# Patient Record
Sex: FEMALE
[De-identification: names, ages, dates, MRNs, and addresses within clinical notes are randomized per-mention and may not be internally consistent; named-entity substitution may affect disease eponyms.]

---

## 2020-03-01 ENCOUNTER — RX ONLY (OUTPATIENT)
Age: 30
Setting detail: RX ONLY
End: 2020-03-01

## 2020-06-29 ENCOUNTER — RX ONLY (OUTPATIENT)
Age: 30
Setting detail: RX ONLY
End: 2020-06-29

## 2020-07-15 ENCOUNTER — RX ONLY (OUTPATIENT)
Age: 30
Setting detail: RX ONLY
End: 2020-07-15

## 2021-10-27 ENCOUNTER — APPOINTMENT (RX ONLY)
Dept: URBAN - METROPOLITAN AREA CLINIC 56 | Facility: CLINIC | Age: 31
Setting detail: DERMATOLOGY
End: 2021-10-27

## 2021-10-27 DIAGNOSIS — L70.0 ACNE VULGARIS: ICD-10-CM

## 2021-10-27 PROCEDURE — 99212 OFFICE O/P EST SF 10 MIN: CPT

## 2021-10-27 PROCEDURE — ? TREATMENT REGIMEN

## 2021-10-27 PROCEDURE — ? PRESCRIPTION

## 2021-10-27 RX ORDER — ADAPALENE AND BENZOYL PEROXIDE 1; 25 MG/G; MG/G
GEL TOPICAL QHS
Qty: 45 | Refills: 6 | Status: ERX | COMMUNITY
Start: 2021-10-27

## 2021-10-27 RX ORDER — CLINDAMYCIN PHOSPHATE 10 MG/G
GEL TOPICAL
Qty: 60 | Refills: 6 | Status: ERX | COMMUNITY
Start: 2021-10-27

## 2021-10-27 RX ADMIN — CLINDAMYCIN PHOSPHATE: 10 GEL TOPICAL at 00:00

## 2021-10-27 RX ADMIN — ADAPALENE AND BENZOYL PEROXIDE: 1; 25 GEL TOPICAL at 00:00

## 2021-10-27 NOTE — PROCEDURE: MIPS QUALITY
Detail Level: Detailed
Quality 226: Preventive Care And Screening: Tobacco Use: Screening And Cessation Intervention: Patient screened for tobacco use and is an ex/non-smoker
Quality 431: Preventive Care And Screening: Unhealthy Alcohol Use - Screening: Patient not identified as an unhealthy alcohol user when screened for unhealthy alcohol use using a systematic screening method
Quality 111:Pneumonia Vaccination Status For Older Adults: Pneumococcal Vaccination not Administered or Previously Received, Reason not Otherwise Specified
Quality 110: Preventive Care And Screening: Influenza Immunization: Influenza Immunization Administered during Influenza season

## 2021-10-27 NOTE — PROCEDURE: TREATMENT REGIMEN
Detail Level: Zone
Samples Given: CeraVe Benzoyl Peroxide wash
Plan: Recommended she return in 6 months for follow-up.  Patient decides to pursue treatment of her acne with Accutane and to avoid pregnancy as needed she will contact me earlier.
Continue Regimen: Continue adapalene 0.1%/benzoyl peroxide 2.5% gel daily at bedtime and clindamycin 1% gel every morning.
Samples Given: Patient was given samples of CereVe benzyl peroxide facial cleanser to try.

## 2022-04-27 ENCOUNTER — APPOINTMENT (RX ONLY)
Dept: URBAN - METROPOLITAN AREA CLINIC 56 | Facility: CLINIC | Age: 32
Setting detail: DERMATOLOGY
End: 2022-04-27

## 2022-04-27 DIAGNOSIS — L70.0 ACNE VULGARIS: ICD-10-CM

## 2022-04-27 PROCEDURE — ? TREATMENT REGIMEN

## 2022-04-27 PROCEDURE — 99213 OFFICE O/P EST LOW 20 MIN: CPT

## 2022-04-27 NOTE — PROCEDURE: MIPS QUALITY
Detail Level: Detailed
Quality 226: Preventive Care And Screening: Tobacco Use: Screening And Cessation Intervention: Patient screened for tobacco use and is an ex/non-smoker
Quality 431: Preventive Care And Screening: Unhealthy Alcohol Use - Screening: Patient not identified as an unhealthy alcohol user when screened for unhealthy alcohol use using a systematic screening method
Quality 110: Preventive Care And Screening: Influenza Immunization: Influenza Immunization not Administered for Documented Reasons.

## 2022-04-27 NOTE — PROCEDURE: TREATMENT REGIMEN
Continue Regimen: Continue adapalene 0.1%/benzoyl peroxide 2.5% gel nightly and clindamycin 1% gel every morning.  She will also continue CeraVe a benzyl peroxide facial cleanser.  Patient will contact the office if she needs refills.
Detail Level: Zone
dressing

## 2022-04-27 NOTE — HPI: OTHER
Condition:: Follow-up acne treatment
Please Describe Your Condition:: Patient returns for follow-up of treatment of facial acne.  She currently is using adapalene 0.1%/benzoyl peroxide 2.5% gel nightly and clindamycin 1% gel every morning.  She also uses CeraVe a benzyl peroxide facial cleanser.  Since I last saw her she is changed her diet which she feels is greatly benefited her acne.  She notes some flaring of her acne associated with stress.  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  Patient discontinued Bactrim as she was attempting to get pregnant.  Several times I have recommended treatment with isotretinoin when her acne was severe but at this point her acne seems to be doing quite well.

## 2022-11-14 ENCOUNTER — RX ONLY (OUTPATIENT)
Age: 32
Setting detail: RX ONLY
End: 2022-11-14

## 2022-11-14 RX ORDER — ADAPALENE AND BENZOYL PEROXIDE 1; 25 MG/G; MG/G
GEL TOPICAL
Qty: 45 | Refills: 3 | Status: ERX

## 2024-02-14 ENCOUNTER — RX ONLY (OUTPATIENT)
Age: 34
Setting detail: RX ONLY
End: 2024-02-14

## 2024-02-14 ENCOUNTER — APPOINTMENT (RX ONLY)
Dept: URBAN - METROPOLITAN AREA CLINIC 56 | Facility: CLINIC | Age: 34
Setting detail: DERMATOLOGY
End: 2024-02-14

## 2024-02-14 DIAGNOSIS — L70.0 ACNE VULGARIS: ICD-10-CM | Status: WORSENING

## 2024-02-14 PROCEDURE — 99214 OFFICE O/P EST MOD 30 MIN: CPT

## 2024-02-14 PROCEDURE — ? TREATMENT REGIMEN

## 2024-02-14 PROCEDURE — ? PRESCRIPTION

## 2024-02-14 RX ORDER — CLINDAMYCIN PHOSPHATE 10 MG/G
GEL TOPICAL
Qty: 60 | Refills: 3 | Status: ERX

## 2024-02-14 RX ORDER — CLINDAMYCIN PHOSPHATE AND BENZOYL PEROXIDE 1 %-5 %
KIT TOPICAL
Qty: 50 | Refills: 3 | Status: ERX | COMMUNITY
Start: 2024-02-14

## 2024-02-14 RX ORDER — ADAPALENE AND BENZOYL PEROXIDE 1; 25 MG/G; MG/G
GEL TOPICAL
Qty: 45 | Refills: 3 | Status: ERX

## 2024-02-14 RX ORDER — SULFAMETHOXAZOLE AND TRIMETHOPRIM 800; 160 MG/1; MG/1
TABLET ORAL BID
Qty: 60 | Refills: 2 | Status: ERX | COMMUNITY
Start: 2024-02-14

## 2024-02-14 RX ADMIN — SULFAMETHOXAZOLE AND TRIMETHOPRIM: 800; 160 TABLET ORAL at 00:00

## 2024-02-14 RX ADMIN — CLINDAMYCIN PHOSPHATE AND BENZOYL PEROXIDE: KIT at 00:00

## 2024-02-14 NOTE — PROCEDURE: TREATMENT REGIMEN
Hide Cerave Products: No
Action 4: Continue
Initiate Regimen: Bactrim double strength tablets 1 p.o. twice daily.  Recommend she stay on this dose for 1 month and then decrease to 1 double strength tablet daily if doing better.  She was told to take the medication with a large glass of water\\nAdapalene 0.1%/benzyl peroxide 2.5% gel nightly and clindamycin 1% gel every morning.\\nPatient was given samples and will start use of CeraVe a 4% foaming benzyl peroxide cleanser to wash her face daily to twice daily.
Plan: Again discussed possible use of isotretinoin with the patient and she was not interested.Could also consider a trial of spironolactone in the future though she would need to avoid pregnancy with this.
Detail Level: Zone

## 2024-02-14 NOTE — HPI: OTHER
Condition:: Acne vulgaris
Please Describe Your Condition:: Patient returns after several year absence complaining of continued problems with acne involving the face and neck.  She currently is using an over-the-counter salicylic acid cleanser which did not seem to help a great deal.  In the past she was treated with ampicillin and Bactrim and seem to respond best to Bactrim.  Bactrim was Discontinued as she was trying to get pregnant.  Patient still has been unable to conceive a baby at this point would like to get back on more effective treatment for acne.  In the past I have discussed treatment with isotretinoin with the patient.  The need for monthly follow-up checks and laboratory follow-up was discussed.  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  At this point she does not wish to consider isotretinoin.

## 2024-04-17 ENCOUNTER — APPOINTMENT (RX ONLY)
Dept: URBAN - METROPOLITAN AREA CLINIC 56 | Facility: CLINIC | Age: 34
Setting detail: DERMATOLOGY
End: 2024-04-17

## 2024-04-17 DIAGNOSIS — L20.89 OTHER ATOPIC DERMATITIS: ICD-10-CM

## 2024-04-17 DIAGNOSIS — L70.0 ACNE VULGARIS: ICD-10-CM

## 2024-04-17 PROCEDURE — ? TREATMENT REGIMEN

## 2024-04-17 PROCEDURE — 99213 OFFICE O/P EST LOW 20 MIN: CPT

## 2024-04-17 PROCEDURE — ? PRESCRIPTION

## 2024-04-17 RX ORDER — BETAMETHASONE DIPROPIONATE 0.5 MG/G
CREAM TOPICAL
Qty: 45 | Refills: 3 | Status: ERX | COMMUNITY
Start: 2024-04-17

## 2024-04-17 RX ADMIN — BETAMETHASONE DIPROPIONATE: 0.5 CREAM TOPICAL at 00:00

## 2024-04-17 ASSESSMENT — LOCATION DETAILED DESCRIPTION DERM
LOCATION DETAILED: RIGHT POPLITEAL SKIN
LOCATION DETAILED: LEFT POPLITEAL SKIN
LOCATION DETAILED: LEFT ANTERIOR SHOULDER

## 2024-04-17 ASSESSMENT — LOCATION SIMPLE DESCRIPTION DERM
LOCATION SIMPLE: RIGHT POPLITEAL SKIN
LOCATION SIMPLE: LEFT SHOULDER
LOCATION SIMPLE: LEFT POPLITEAL SKIN

## 2024-04-17 ASSESSMENT — LOCATION ZONE DERM
LOCATION ZONE: LEG
LOCATION ZONE: ARM

## 2024-04-17 NOTE — HPI: OTHER
Condition:: Acne vulgaris
Please Describe Your Condition:: Patient returns for follow-up of treatment of acne involving the face and neck.  At her last appointment 2 months ago she was put on Bactrim double strength tablets 1 twice daily and was told to use with adapalene 0.1%/benzyl peroxide 2.5% gel nightly And was started on CeraVe a 4% benzyl peroxide foaming cleanser.  With the combination of these medication she has noted good improvement and is not developing deeper inflammatory papules or cystic acne as a result.  She denies any side effects or problems with the medications Other than some mild dryness with the topical medication.

## 2024-04-17 NOTE — PROCEDURE: TREATMENT REGIMEN
Hide Cerave Products: No
Action 4: Continue
Continue Regimen: Patient will begin tapering down dose of Bactrim double strength tablets.  She will take 1 tablet twice daily alternating with 1 tablet daily for 1 month and if doing well then further decrease to 1 tablet daily till I see her again.\\nAdapalene 0.1%/benzyl peroxide 2.5% gel nightly and clindamycin 1% gel every morning.
Plan: use of CeraVe a 4% foaming benzyl peroxide cleanser to wash her face daily to twice daily.
Detail Level: Zone
Initiate Treatment: betamethasone dipropionate 0.05 % topical cream BID to areas of eczema as needed.

## 2024-11-27 ENCOUNTER — RX ONLY (OUTPATIENT)
Age: 34
Setting detail: RX ONLY
End: 2024-11-27

## 2024-11-27 ENCOUNTER — APPOINTMENT (RX ONLY)
Dept: URBAN - METROPOLITAN AREA CLINIC 56 | Facility: CLINIC | Age: 34
Setting detail: DERMATOLOGY
End: 2024-11-27

## 2024-11-27 DIAGNOSIS — L70.0 ACNE VULGARIS: ICD-10-CM

## 2024-11-27 PROCEDURE — 99214 OFFICE O/P EST MOD 30 MIN: CPT

## 2024-11-27 PROCEDURE — ? PRESCRIPTION

## 2024-11-27 PROCEDURE — ? TREATMENT REGIMEN

## 2024-11-27 RX ORDER — SULFAMETHOXAZOLE AND TRIMETHOPRIM 800; 160 MG/1; MG/1
TABLET ORAL BID
Qty: 60 | Refills: 2 | Status: ERX

## 2024-11-27 RX ORDER — BETAMETHASONE DIPROPIONATE 0.5 MG/G
CREAM TOPICAL
Qty: 45 | Refills: 3 | Status: ERX

## 2024-11-27 RX ORDER — TRETIONIN 0.5 MG/G
CREAM TOPICAL
Qty: 20 | Refills: 6 | Status: ERX | COMMUNITY
Start: 2024-11-27

## 2024-11-27 RX ORDER — ADAPALENE AND BENZOYL PEROXIDE 1; 25 MG/G; MG/G
GEL TOPICAL
Qty: 45 | Refills: 3 | Status: ERX | COMMUNITY
Start: 2024-11-27

## 2024-11-27 RX ADMIN — TRETIONIN: 0.5 CREAM TOPICAL at 00:00

## 2024-11-27 NOTE — PROCEDURE: TREATMENT REGIMEN
Hide Cerave Products: No
Action 4: Continue
Initiate Regimen: tretinoin 0.05 % topical cream nightly.  Recommend she initially alternate this with adapalene 0.1%/benzyl peroxide 2.5% gel nightly.  If she finds she prefers the tretinoin 0.05% cream she may use this only at night.  Recommend she start out with a test area before applying the tretinoin cream to her entire face.
Continue Regimen: Continue 1 tablet of Bactrim double strength. \\nAdapalene 0.1%/benzyl peroxide 2.5% gel nightly and clindamycin 1% gel every morning.
Plan: use of CeraVe a 4% foaming benzyl peroxide cleanser to wash her face daily to twice daily.
Detail Level: Zone